# Patient Record
Sex: FEMALE | Race: WHITE | NOT HISPANIC OR LATINO | Employment: OTHER | URBAN - METROPOLITAN AREA
[De-identification: names, ages, dates, MRNs, and addresses within clinical notes are randomized per-mention and may not be internally consistent; named-entity substitution may affect disease eponyms.]

---

## 2017-02-14 ENCOUNTER — APPOINTMENT (OUTPATIENT)
Dept: PHYSICAL THERAPY | Facility: CLINIC | Age: 67
End: 2017-02-14
Payer: MEDICARE

## 2017-02-14 PROCEDURE — 97162 PT EVAL MOD COMPLEX 30 MIN: CPT

## 2017-02-14 PROCEDURE — G8981 BODY POS CURRENT STATUS: HCPCS

## 2017-02-14 PROCEDURE — G8982 BODY POS GOAL STATUS: HCPCS

## 2017-02-16 ENCOUNTER — APPOINTMENT (OUTPATIENT)
Dept: PHYSICAL THERAPY | Facility: CLINIC | Age: 67
End: 2017-02-16
Payer: MEDICARE

## 2017-02-16 PROCEDURE — 97110 THERAPEUTIC EXERCISES: CPT

## 2017-02-21 ENCOUNTER — APPOINTMENT (OUTPATIENT)
Dept: PHYSICAL THERAPY | Facility: CLINIC | Age: 67
End: 2017-02-21
Payer: MEDICARE

## 2017-02-21 PROCEDURE — 97110 THERAPEUTIC EXERCISES: CPT

## 2017-02-23 ENCOUNTER — APPOINTMENT (OUTPATIENT)
Dept: PHYSICAL THERAPY | Facility: CLINIC | Age: 67
End: 2017-02-23
Payer: MEDICARE

## 2017-02-23 PROCEDURE — 97110 THERAPEUTIC EXERCISES: CPT

## 2017-03-02 ENCOUNTER — APPOINTMENT (OUTPATIENT)
Dept: PHYSICAL THERAPY | Facility: CLINIC | Age: 67
End: 2017-03-02
Payer: MEDICARE

## 2017-03-07 ENCOUNTER — APPOINTMENT (OUTPATIENT)
Dept: PHYSICAL THERAPY | Facility: CLINIC | Age: 67
End: 2017-03-07
Payer: MEDICARE

## 2017-03-07 PROCEDURE — 97110 THERAPEUTIC EXERCISES: CPT

## 2017-03-09 ENCOUNTER — APPOINTMENT (OUTPATIENT)
Dept: PHYSICAL THERAPY | Facility: CLINIC | Age: 67
End: 2017-03-09
Payer: MEDICARE

## 2017-03-09 PROCEDURE — 97110 THERAPEUTIC EXERCISES: CPT

## 2017-03-14 ENCOUNTER — APPOINTMENT (OUTPATIENT)
Dept: PHYSICAL THERAPY | Facility: CLINIC | Age: 67
End: 2017-03-14
Payer: MEDICARE

## 2017-03-16 ENCOUNTER — APPOINTMENT (OUTPATIENT)
Dept: PHYSICAL THERAPY | Facility: CLINIC | Age: 67
End: 2017-03-16
Payer: MEDICARE

## 2017-03-16 PROCEDURE — 97110 THERAPEUTIC EXERCISES: CPT

## 2017-03-20 ENCOUNTER — APPOINTMENT (OUTPATIENT)
Dept: PHYSICAL THERAPY | Facility: CLINIC | Age: 67
End: 2017-03-20
Payer: MEDICARE

## 2017-03-20 PROCEDURE — 97110 THERAPEUTIC EXERCISES: CPT

## 2017-03-21 ENCOUNTER — APPOINTMENT (OUTPATIENT)
Dept: PHYSICAL THERAPY | Facility: CLINIC | Age: 67
End: 2017-03-21
Payer: MEDICARE

## 2017-03-23 ENCOUNTER — APPOINTMENT (OUTPATIENT)
Dept: PHYSICAL THERAPY | Facility: CLINIC | Age: 67
End: 2017-03-23
Payer: MEDICARE

## 2018-01-18 NOTE — RESULT NOTES
Verified Results  (1) THIN PREP PAP WITH IMAGING [id number] Comfort Johnson Order Number: [id number]     Test Name Result Flag Reference   LAB AP CASE REPORT (Report)     Gynecologic Cytology Report            Case: [id number]                  Authorizing Provider: FREDI Oliveira    Collected:      08/08/2016 1127        First Screen:     CHICHI Gary Received:      08/11/2016 7273        Specimen:  LIQUID-BASED PAP, SCREENING, Cervix   LAB AP GYN PRIMARY INTERPRETATION      Negative for intraepithelial lesion or malignancy  Electronically signed by CHICHI Gary on 8/16/2016 at 12:52 PM   LAB AP GYN SPECIMEN ADEQUACY      Satisfactory for evaluation  (See note)   LAB AP GYN NOTE      No endocervical cells identified  It is difficult to differentiate between   squamous metaplastic cells and parabasal cells in atrophic specimens due   to numerous causes including menopause, postpartum changes and   progestational agents  LAB AP GYN ADDITIONAL INFORMATION (Report)     [employer] FDA approved ,  and ThinPrep Imaging System are   utilized with strict adherence to the 's instruction manual to   prepare gynecologic and non-gynecologic cytology specimens for the   production of ThinPrep slides as well as for gynecologic ThinPrep imaging  These processes have been validated by our laboratory and/or by the     The Pap test is not a diagnostic procedure and should not be used as the   sole means to detect cervical cancer  It is only a screening procedure to   aid in the detection of cervical cancer and its precursors  Both   false-negative and false-positive results have been experienced  Your   patient's test result should be interpreted in this context together with   the history and clinical findings  LAB AP LMP      Performing Comments: CERVICAL - ENDOCERVICAL  ROUTINE PAP TEST     Performing Comments: CERVICAL - ENDOCERVICAL  ROUTINE PAP TEST

## 2021-07-28 ENCOUNTER — ANESTHESIA EVENT (OUTPATIENT)
Dept: PERIOP | Facility: HOSPITAL | Age: 71
End: 2021-07-28
Payer: MEDICARE

## 2021-07-28 RX ORDER — OMEGA-3S/DHA/EPA/FISH OIL/D3 300MG-1000
400 CAPSULE ORAL DAILY
COMMUNITY

## 2021-07-28 RX ORDER — IRBESARTAN 150 MG/1
TABLET ORAL
COMMUNITY
Start: 2021-07-01

## 2021-07-28 RX ORDER — ATORVASTATIN CALCIUM 10 MG/1
10 TABLET, FILM COATED ORAL
COMMUNITY
Start: 2021-07-12

## 2021-07-28 RX ORDER — BETAMETHASONE DIPROPIONATE 0.5 MG/G
CREAM TOPICAL
COMMUNITY
Start: 2021-06-03

## 2021-07-28 RX ORDER — IBUPROFEN 600 MG/1
TABLET ORAL
COMMUNITY
Start: 2021-04-21

## 2021-07-28 RX ORDER — ESOMEPRAZOLE MAGNESIUM 40 MG/1
40 CAPSULE, DELAYED RELEASE ORAL
COMMUNITY
Start: 2021-05-20

## 2021-07-28 RX ORDER — CELECOXIB 200 MG/1
200 CAPSULE ORAL 2 TIMES DAILY
COMMUNITY
Start: 2021-05-19

## 2021-07-28 RX ORDER — DICYCLOMINE HCL 20 MG
TABLET ORAL
COMMUNITY
Start: 2021-06-23

## 2021-07-28 RX ORDER — AMLODIPINE BESYLATE 5 MG/1
10 TABLET ORAL
COMMUNITY
Start: 2021-06-13

## 2021-07-28 RX ORDER — PROPRANOLOL/HYDROCHLOROTHIAZID 40 MG-25MG
TABLET ORAL
COMMUNITY

## 2021-07-28 RX ORDER — ALPRAZOLAM 0.25 MG/1
TABLET ORAL
COMMUNITY
Start: 2021-07-27

## 2021-07-28 NOTE — PRE-PROCEDURE INSTRUCTIONS
Pre-Surgery Instructions:   Medication Instructions    ALPRAZolam (XANAX) 0 25 mg tablet Patient was instructed by Physician and understands   amLODIPine (NORVASC) 5 mg tablet Instructed patient per Anesthesia Guidelines   atorvastatin (LIPITOR) 10 mg tablet Instructed patient per Anesthesia Guidelines   betamethasone, augmented, (DIPROLENE-AF) 0 05 % cream Instructed patient per Anesthesia Guidelines   celecoxib (CeleBREX) 200 mg capsule Instructed patient per Anesthesia Guidelines   cholecalciferol (VITAMIN D3) 400 units tablet Instructed patient per Anesthesia Guidelines   dicyclomine (BENTYL) 20 mg tablet Instructed patient per Anesthesia Guidelines   esomeprazole (NexIUM) 40 MG capsule Instructed patient per Anesthesia Guidelines   ibuprofen (MOTRIN) 600 mg tablet Instructed patient per Anesthesia Guidelines   irbesartan (AVAPRO) 150 mg tablet Instructed patient per Anesthesia Guidelines   Peppermint Oil (IBGARD PO) Instructed patient per Anesthesia Guidelines   Turmeric 500 MG CAPS Instructed patient per Anesthesia Guidelines  All pre-op instructions reviewed as per [facility] My [facility] Experience w/pt verb understanding  Inst NPO post MN night before sx except sips water w/ xanax & nexium & bentyl on am of sx Inst to continue avapro through day before sx, but to HOLD on morning of sx  Inst to take lipitor & amlodipine evening before sx as she typically does  Instructed to avoid all ASA and OTC Vit/Supp 1 week prior to surgery and to avoid NSAIDs 3 days prior to surgery  Tylenol ok to take prn  Received pre-op showering instructions from surgeon office, has CHG, reviewed process at time of call  Current hospital visitor & masking policy reviewed  Pt verbalized an understanding of all instructions reviewed and offers no concerns at this time

## 2021-08-10 NOTE — ANESTHESIA PREPROCEDURE EVALUATION
Procedure:  REPAIR MOHS OF NOSE (N/A Nose)    Relevant Problems   CARDIO   (+) HTN (hypertension)        Physical Exam    Airway    Mallampati score: II  TM Distance: >3 FB  Neck ROM: full     Dental   No notable dental hx     Cardiovascular  Cardiovascular exam normal    Pulmonary  Pulmonary exam normal     Other Findings        Anesthesia Plan  ASA Score- 2     Anesthesia Type- IV sedation with anesthesia with ASA Monitors  Additional Monitors:   Airway Plan:           Plan Factors-Exercise tolerance (METS): >4 METS  Chart reviewed  Patient is not a current smoker  Patient not instructed to abstain from smoking on day of procedure  Patient did not smoke on day of surgery  Induction- intravenous  Postoperative Plan-     Informed Consent- Anesthetic plan and risks discussed with patient  I personally reviewed this patient with the CRNA  Discussed and agreed on the Anesthesia Plan with the CRNA  Escobar Diaz

## 2021-08-11 ENCOUNTER — ANESTHESIA (OUTPATIENT)
Dept: PERIOP | Facility: HOSPITAL | Age: 71
End: 2021-08-11
Payer: MEDICARE

## 2021-08-11 ENCOUNTER — HOSPITAL ENCOUNTER (OUTPATIENT)
Facility: HOSPITAL | Age: 71
Setting detail: OUTPATIENT SURGERY
Discharge: HOME | End: 2021-08-11
Attending: PLASTIC SURGERY | Admitting: PLASTIC SURGERY
Payer: MEDICARE

## 2021-08-11 VITALS
WEIGHT: 216 LBS | RESPIRATION RATE: 18 BRPM | HEIGHT: 63 IN | TEMPERATURE: 97.8 F | SYSTOLIC BLOOD PRESSURE: 130 MMHG | OXYGEN SATURATION: 97 % | BODY MASS INDEX: 38.27 KG/M2 | HEART RATE: 81 BPM | DIASTOLIC BLOOD PRESSURE: 64 MMHG

## 2021-08-11 PROBLEM — I10 HTN (HYPERTENSION): Status: ACTIVE | Noted: 2021-08-11

## 2021-08-11 RX ORDER — HYDROCODONE BITARTRATE AND ACETAMINOPHEN 5; 325 MG/1; MG/1
1 TABLET ORAL EVERY 6 HOURS PRN
Status: DISCONTINUED | OUTPATIENT
Start: 2021-08-11 | End: 2021-08-11 | Stop reason: HOSPADM

## 2021-08-11 RX ORDER — PROPOFOL 10 MG/ML
INJECTION, EMULSION INTRAVENOUS AS NEEDED
Status: DISCONTINUED | OUTPATIENT
Start: 2021-08-11 | End: 2021-08-11

## 2021-08-11 RX ORDER — ONDANSETRON 4 MG/1
4 TABLET, ORALLY DISINTEGRATING ORAL EVERY 6 HOURS PRN
Status: DISCONTINUED | OUTPATIENT
Start: 2021-08-11 | End: 2021-08-11 | Stop reason: HOSPADM

## 2021-08-11 RX ORDER — METOCLOPRAMIDE HYDROCHLORIDE 5 MG/ML
10 INJECTION INTRAMUSCULAR; INTRAVENOUS ONCE AS NEEDED
Status: DISCONTINUED | OUTPATIENT
Start: 2021-08-11 | End: 2021-08-11 | Stop reason: HOSPADM

## 2021-08-11 RX ORDER — FENTANYL CITRATE 50 UG/ML
INJECTION, SOLUTION INTRAMUSCULAR; INTRAVENOUS AS NEEDED
Status: DISCONTINUED | OUTPATIENT
Start: 2021-08-11 | End: 2021-08-11

## 2021-08-11 RX ORDER — BALANCED SALT SOLUTION 6.4; .75; .48; .3; 3.9; 1.7 MG/ML; MG/ML; MG/ML; MG/ML; MG/ML; MG/ML
SOLUTION OPHTHALMIC AS NEEDED
Status: DISCONTINUED | OUTPATIENT
Start: 2021-08-11 | End: 2021-08-11 | Stop reason: HOSPADM

## 2021-08-11 RX ORDER — SODIUM CHLORIDE, SODIUM LACTATE, POTASSIUM CHLORIDE, CALCIUM CHLORIDE 600; 310; 30; 20 MG/100ML; MG/100ML; MG/100ML; MG/100ML
125 INJECTION, SOLUTION INTRAVENOUS CONTINUOUS
Status: DISCONTINUED | OUTPATIENT
Start: 2021-08-11 | End: 2021-08-11 | Stop reason: HOSPADM

## 2021-08-11 RX ORDER — FENTANYL CITRATE/PF 50 MCG/ML
25 SYRINGE (ML) INJECTION
Status: DISCONTINUED | OUTPATIENT
Start: 2021-08-11 | End: 2021-08-11 | Stop reason: HOSPADM

## 2021-08-11 RX ORDER — PROPOFOL 10 MG/ML
INJECTION, EMULSION INTRAVENOUS CONTINUOUS PRN
Status: DISCONTINUED | OUTPATIENT
Start: 2021-08-11 | End: 2021-08-11

## 2021-08-11 RX ORDER — MIDAZOLAM HYDROCHLORIDE 2 MG/2ML
INJECTION, SOLUTION INTRAMUSCULAR; INTRAVENOUS AS NEEDED
Status: DISCONTINUED | OUTPATIENT
Start: 2021-08-11 | End: 2021-08-11

## 2021-08-11 RX ORDER — LIDOCAINE HYDROCHLORIDE AND EPINEPHRINE 5; 5 MG/ML; UG/ML
INJECTION, SOLUTION INFILTRATION; PERINEURAL AS NEEDED
Status: DISCONTINUED | OUTPATIENT
Start: 2021-08-11 | End: 2021-08-11 | Stop reason: HOSPADM

## 2021-08-11 RX ORDER — CEFAZOLIN SODIUM 2 G/50ML
SOLUTION INTRAVENOUS AS NEEDED
Status: DISCONTINUED | OUTPATIENT
Start: 2021-08-11 | End: 2021-08-11

## 2021-08-11 RX ORDER — ONDANSETRON 2 MG/ML
4 INJECTION INTRAMUSCULAR; INTRAVENOUS ONCE AS NEEDED
Status: DISCONTINUED | OUTPATIENT
Start: 2021-08-11 | End: 2021-08-11 | Stop reason: HOSPADM

## 2021-08-11 RX ORDER — LIDOCAINE HYDROCHLORIDE 10 MG/ML
INJECTION, SOLUTION EPIDURAL; INFILTRATION; INTRACAUDAL; PERINEURAL AS NEEDED
Status: DISCONTINUED | OUTPATIENT
Start: 2021-08-11 | End: 2021-08-11

## 2021-08-11 RX ADMIN — FENTANYL CITRATE 50 MCG: 50 INJECTION, SOLUTION INTRAMUSCULAR; INTRAVENOUS at 15:23

## 2021-08-11 RX ADMIN — PROPOFOL 30 MG: 10 INJECTION, EMULSION INTRAVENOUS at 15:36

## 2021-08-11 RX ADMIN — PROPOFOL 80 MG: 10 INJECTION, EMULSION INTRAVENOUS at 15:27

## 2021-08-11 RX ADMIN — LIDOCAINE HYDROCHLORIDE 50 MG: 10 INJECTION, SOLUTION EPIDURAL; INFILTRATION; INTRACAUDAL; PERINEURAL at 15:27

## 2021-08-11 RX ADMIN — PROPOFOL 30 MG: 10 INJECTION, EMULSION INTRAVENOUS at 15:40

## 2021-08-11 RX ADMIN — MIDAZOLAM 2 MG: 1 INJECTION INTRAMUSCULAR; INTRAVENOUS at 15:23

## 2021-08-11 RX ADMIN — FENTANYL CITRATE 50 MCG: 50 INJECTION, SOLUTION INTRAMUSCULAR; INTRAVENOUS at 15:51

## 2021-08-11 RX ADMIN — CEFAZOLIN SODIUM 2000 MG: 2 SOLUTION INTRAVENOUS at 15:16

## 2021-08-11 RX ADMIN — SODIUM CHLORIDE, SODIUM LACTATE, POTASSIUM CHLORIDE, AND CALCIUM CHLORIDE: .6; .31; .03; .02 INJECTION, SOLUTION INTRAVENOUS at 14:49

## 2021-08-11 RX ADMIN — PROPOFOL 75 MCG/KG/MIN: 10 INJECTION, EMULSION INTRAVENOUS at 15:27

## 2021-08-11 NOTE — OP NOTE
OPERATIVE REPORT  PATIENT NAME: Fawad Vaca    :  1950  MRN: [id number]  Pt Location:  OR ROOM 08    SURGERY DATE: 2021    Surgeon(s) and Role:     * Nicanor Llamas MD - Primary    Preop and postoperative Diagnosis:  Mohs defect left mid nose    Operative Procedure(s) (LRB):  REPAIR MOHS OF NOSE FLAP RECONSTRUCTION (N/A) Nasalis keystone advancement muscular cutaneous flap    Operative history:  The patient had Mohs surgery today to remove a basal cell carcinoma from her left mid nose  This left an oval defect that was 9 mm in vertical height by about 15 mm in width  A keystone flap was designed over then Ace Allis muscle superior to this of size identical   This was then advanced to close the defect with minimal tension  Operative procedure: The patient was taken to the operating placed supine the operating table  She was prepped and draped in usual fashion  Anesthesia was general supplemented xylocaine 1% with epinephrine  Hemostasis in the Mohs defect was achieved with the bipolar  A keystone flap was then designed superior to the defect  The sides were cut and spread to allow of the flexibility of the flap  The flap was then advanced the lesser curvature sewn into the distal aspect of the Mohs defect with 4-O interrupted simple sutures the sides and greater curvature similarly sewed it to the surrounding nasal skin with 4-0 nylon interrupted simple sutures  Light dressings were applied  The patient tolerated procedure well 10 taken to recovery in good condition      SIGNATURE: Nicanor Llamas MD  DATE: 2021  TIME: 4:14 PM

## 2021-08-11 NOTE — ANESTHESIA POSTPROCEDURE EVALUATION
Post-Op Assessment Note    CV Status:  Stable  Pain Score: 0    Pain management: adequate     Mental Status:  Alert and awake   Hydration Status:  Euvolemic   PONV Controlled:  Controlled   Airway Patency:  Patent      Post Op Vitals Reviewed: Yes      Staff: CRNA         No complications documented      /69 (08/11/21 1614)    Temp 97 6 °F (36 4 °C) (08/11/21 1614)    Pulse 85 (08/11/21 1614)   Resp 16 (08/11/21 1614)    SpO2 95 % (08/11/21 1614)

## 2021-08-11 NOTE — INTERVAL H&P NOTE
H&P reviewed  After examining the patient I find no changes in the patients condition since the H&P had been written      Vitals:    08/11/21 1150   BP: 125/63   Pulse: 97   Resp: 18   Temp: 98 6 °F (37 °C)   SpO2: 95%

## 2021-08-11 NOTE — DISCHARGE INSTRUCTIONS
Care For Your Stitches   WHAT YOU NEED TO KNOW:   Stitches, or sutures, are used to close cuts and wounds on the skin  Stitches need to be removed after your wound has healed  DISCHARGE INSTRUCTIONS:   Seek care immediately if:   · Your stitches come apart  · Blood soaks through your bandages  · You suddenly cannot move your injured joint  · You have sudden numbness around your wound  · You see red streaks coming from your wound  Contact your healthcare provider if:   · You have a fever and chills  · Your wound is red, warm, swollen, or leaking pus  · There is a bad smell coming from your wound  · You have increased pain in the wound area  · You have questions or concerns about your condition or care  Care for your stitches:   · Protect the stitches  You may need to cover your stitches with a bandage for 24 to 48 hours, or as directed  Do not bump or hit the suture area  This could open the wound  Do not trim or shorten the ends of your stitches  If they rub on your clothing, put a gauze bandage between the stitches and your clothes  · Clean the area as directed  Carefully wash your wound with soap and water  For mouth and lip wounds, rinse your mouth after meals and at bedtime  Ask your healthcare provider what to use to rinse your mouth  If you have a scalp wound, you may gently wash your hair every 2 days with mild shampoo  Do not use hair products, such as hair spray  Check your wound for signs of infection when you clean it  Signs include redness, swelling, and pus  · Keep the area dry as directed  Wait 12 to 24 hours after you receive your stitches before you take a shower  Take showers instead of baths  Do not take a bath or swim  Your healthcare provider will give you instructions for bathing with your stitches  Help your wound heal:   · Elevate your wound  Keep your wound above the level of your heart as often as you can   This will help decrease swelling and pain  Prop the area on pillows or blankets, if possible, to keep it elevated comfortably  · Limit activity  Do not stretch the skin around your wound  This will help prevent bleeding and swelling  Follow up with your healthcare provider as directed: You may need to return to have your stitches removed  Write down your questions so you remember to ask them during your visits  © Copyright [employer] 2021 Information is for End User's use only and may not be sold, redistributed or otherwise used for commercial purposes  All illustrations and images included in CareNotes® are the copyrighted property of [employer]  or [address] [place], [place] [address]patrice Cano   The above information is an  only  It is not intended as medical advice for individual conditions or treatments  Talk to your doctor, nurse or pharmacist before following any medical regimen to see if it is safe and effective for you

## 2022-09-15 ENCOUNTER — ESTABLISHED COMPREHENSIVE EXAM (OUTPATIENT)
Dept: URBAN - METROPOLITAN AREA CLINIC 6 | Facility: CLINIC | Age: 72
End: 2022-09-15

## 2022-09-15 DIAGNOSIS — H43.813: ICD-10-CM

## 2022-09-15 DIAGNOSIS — H02.834: ICD-10-CM

## 2022-09-15 DIAGNOSIS — H02.831: ICD-10-CM

## 2022-09-15 DIAGNOSIS — H25.813: ICD-10-CM

## 2022-09-15 PROCEDURE — 92015 DETERMINE REFRACTIVE STATE: CPT

## 2022-09-15 PROCEDURE — 92014 COMPRE OPH EXAM EST PT 1/>: CPT

## 2022-09-15 ASSESSMENT — VISUAL ACUITY
OS_GLARE: 20/80
OS_CC: 20/40
OU_CC: J1+
OD_GLARE: 20/80
OD_CC: 20/40-2
OS_PH: 20/40

## 2022-09-15 ASSESSMENT — TONOMETRY
OS_IOP_MMHG: 16
OD_IOP_MMHG: 16

## 2024-01-24 ENCOUNTER — ESTABLISHED COMPREHENSIVE EXAM (OUTPATIENT)
Dept: URBAN - METROPOLITAN AREA CLINIC 6 | Facility: CLINIC | Age: 74
End: 2024-01-24

## 2024-01-24 DIAGNOSIS — H02.834: ICD-10-CM

## 2024-01-24 DIAGNOSIS — H43.813: ICD-10-CM

## 2024-01-24 DIAGNOSIS — H02.831: ICD-10-CM

## 2024-01-24 DIAGNOSIS — H25.813: ICD-10-CM

## 2024-01-24 PROCEDURE — 92014 COMPRE OPH EXAM EST PT 1/>: CPT

## 2024-01-24 ASSESSMENT — VISUAL ACUITY
OS_CC: J2
OD_CC: J2
OD_CC: 20/30
OS_CC: 20/25

## 2024-01-24 ASSESSMENT — TONOMETRY
OS_IOP_MMHG: 14
OD_IOP_MMHG: 13

## 2024-10-02 ENCOUNTER — CATARACT PRE-OP EXAM (OUTPATIENT)
Dept: URBAN - METROPOLITAN AREA CLINIC 6 | Facility: CLINIC | Age: 74
End: 2024-10-02

## 2024-10-02 DIAGNOSIS — H02.834: ICD-10-CM

## 2024-10-02 DIAGNOSIS — H02.831: ICD-10-CM

## 2024-10-02 DIAGNOSIS — H43.813: ICD-10-CM

## 2024-10-02 DIAGNOSIS — H25.813: ICD-10-CM

## 2024-10-02 PROCEDURE — 92014 COMPRE OPH EXAM EST PT 1/>: CPT

## 2024-10-02 PROCEDURE — 92136 OPHTHALMIC BIOMETRY: CPT

## 2024-10-02 ASSESSMENT — VISUAL ACUITY
OD_CC: 20/30-2
OS_GLARE: 20/40-2
OD_GLARE: 20/40
OS_CC: 20/30-2

## 2024-10-02 ASSESSMENT — TONOMETRY
OD_IOP_MMHG: 13
OS_IOP_MMHG: 13

## 2024-10-02 ASSESSMENT — KERATOMETRY
OD_AXISANGLE_DEGREES: 81
OS_AXISANGLE2_DEGREES: 93
OS_K1POWER_DIOPTERS: 42.75
OD_AXISANGLE2_DEGREES: 171
OD_K1POWER_DIOPTERS: 42.50
OD_K2POWER_DIOPTERS: 43.00
OS_K2POWER_DIOPTERS: 43.00
OS_AXISANGLE_DEGREES: 3

## 2025-01-08 ENCOUNTER — 1 DAY POST-OP (OUTPATIENT)
Dept: URBAN - METROPOLITAN AREA CLINIC 6 | Facility: CLINIC | Age: 75
End: 2025-01-08

## 2025-01-08 DIAGNOSIS — Z96.1: ICD-10-CM

## 2025-01-08 DIAGNOSIS — H25.811: ICD-10-CM

## 2025-01-08 PROCEDURE — 92136 OPHTHALMIC BIOMETRY: CPT | Mod: 26,RT

## 2025-01-08 PROCEDURE — 99024 POSTOP FOLLOW-UP VISIT: CPT

## 2025-01-08 ASSESSMENT — TONOMETRY
OS_IOP_MMHG: 19
OD_IOP_MMHG: 13

## 2025-01-08 ASSESSMENT — VISUAL ACUITY
OS_SC: 20/50
OS_PH: 20/40-2
OD_CC: 20/30-2

## 2025-01-08 ASSESSMENT — KERATOMETRY
OD_K2POWER_DIOPTERS: 43.00
OD_AXISANGLE_DEGREES: 81
OD_AXISANGLE2_DEGREES: 171
OD_K1POWER_DIOPTERS: 42.50
OS_K2POWER_DIOPTERS: 43.00
OS_AXISANGLE_DEGREES: 3
OS_AXISANGLE2_DEGREES: 93
OS_K1POWER_DIOPTERS: 42.75

## 2025-01-15 ENCOUNTER — 1 WEEK POST-OP (OUTPATIENT)
Dept: URBAN - METROPOLITAN AREA CLINIC 6 | Facility: CLINIC | Age: 75
End: 2025-01-15

## 2025-01-15 DIAGNOSIS — Z96.1: ICD-10-CM

## 2025-01-15 DIAGNOSIS — H25.811: ICD-10-CM

## 2025-01-15 PROCEDURE — 99024 POSTOP FOLLOW-UP VISIT: CPT

## 2025-01-15 ASSESSMENT — KERATOMETRY
OS_K2POWER_DIOPTERS: 43.00
OD_K2POWER_DIOPTERS: 43.00
OD_AXISANGLE_DEGREES: 81
OS_K1POWER_DIOPTERS: 42.75
OS_AXISANGLE2_DEGREES: 93
OD_K1POWER_DIOPTERS: 42.50
OD_AXISANGLE2_DEGREES: 171
OS_AXISANGLE_DEGREES: 3

## 2025-01-15 ASSESSMENT — VISUAL ACUITY
OS_SC: 20/100
OS_PH: 20/40-1
OU_SC: J3

## 2025-01-15 ASSESSMENT — TONOMETRY
OD_IOP_MMHG: 12
OS_IOP_MMHG: 10

## 2025-01-22 ENCOUNTER — 1 DAY POST-OP (OUTPATIENT)
Dept: URBAN - METROPOLITAN AREA CLINIC 6 | Facility: CLINIC | Age: 75
End: 2025-01-22

## 2025-01-22 DIAGNOSIS — Z96.1: ICD-10-CM

## 2025-01-22 PROCEDURE — 99024 POSTOP FOLLOW-UP VISIT: CPT

## 2025-01-22 ASSESSMENT — KERATOMETRY
OS_K1POWER_DIOPTERS: 42.75
OD_AXISANGLE2_DEGREES: 171
OD_K2POWER_DIOPTERS: 43.00
OD_K1POWER_DIOPTERS: 42.50
OS_K2POWER_DIOPTERS: 43.00
OS_AXISANGLE2_DEGREES: 93
OS_AXISANGLE_DEGREES: 3
OD_AXISANGLE_DEGREES: 81

## 2025-01-22 ASSESSMENT — VISUAL ACUITY
OD_PH: 20/30-1
OS_SC: 20/40-2
OS_PH: 20/30

## 2025-01-22 ASSESSMENT — TONOMETRY
OS_IOP_MMHG: 11
OD_IOP_MMHG: 19

## 2025-01-29 ENCOUNTER — 1 WEEK POST-OP (OUTPATIENT)
Dept: URBAN - METROPOLITAN AREA CLINIC 6 | Facility: CLINIC | Age: 75
End: 2025-01-29

## 2025-01-29 DIAGNOSIS — Z96.1: ICD-10-CM

## 2025-01-29 PROCEDURE — 99024 POSTOP FOLLOW-UP VISIT: CPT

## 2025-01-29 ASSESSMENT — TONOMETRY
OD_IOP_MMHG: 11
OS_IOP_MMHG: 8

## 2025-01-29 ASSESSMENT — KERATOMETRY
OD_AXISANGLE2_DEGREES: 171
OD_K2POWER_DIOPTERS: 43.00
OS_K1POWER_DIOPTERS: 42.75
OS_K2POWER_DIOPTERS: 43.00
OD_AXISANGLE_DEGREES: 81
OS_AXISANGLE2_DEGREES: 93
OS_AXISANGLE_DEGREES: 3
OD_K1POWER_DIOPTERS: 42.50

## 2025-01-29 ASSESSMENT — VISUAL ACUITY
OU_SC: J2
OD_PH: 20/25
OS_SC: 20/30+2
OD_SC: 20/60-1

## 2025-03-07 ENCOUNTER — POST-OP CHECK (OUTPATIENT)
Dept: URBAN - METROPOLITAN AREA CLINIC 6 | Facility: CLINIC | Age: 75
End: 2025-03-07

## 2025-03-07 DIAGNOSIS — Z96.1: ICD-10-CM

## 2025-03-07 PROCEDURE — 99024 POSTOP FOLLOW-UP VISIT: CPT

## 2025-03-07 PROCEDURE — 92134 CPTRZ OPH DX IMG PST SGM RTA: CPT | Mod: NC

## 2025-03-07 ASSESSMENT — KERATOMETRY
OS_AXISANGLE_DEGREES: 3
OD_AXISANGLE_DEGREES: 81
OD_K1POWER_DIOPTERS: 42.50
OD_AXISANGLE2_DEGREES: 171
OD_K2POWER_DIOPTERS: 43.00
OS_K2POWER_DIOPTERS: 43.00
OS_AXISANGLE2_DEGREES: 93
OS_K1POWER_DIOPTERS: 42.75

## 2025-03-07 ASSESSMENT — TONOMETRY
OS_IOP_MMHG: 8
OD_IOP_MMHG: 8

## 2025-03-07 ASSESSMENT — VISUAL ACUITY
OU_SC: J2
OU_SC: 20/40
OD_SC: 20/70
OD_PH: 20/30
OS_SC: 20/30

## 2025-06-11 ENCOUNTER — ESTABLISHED COMPREHENSIVE EXAM (OUTPATIENT)
Dept: URBAN - METROPOLITAN AREA CLINIC 6 | Facility: CLINIC | Age: 75
End: 2025-06-11

## 2025-06-11 DIAGNOSIS — H02.834: ICD-10-CM

## 2025-06-11 DIAGNOSIS — H43.813: ICD-10-CM

## 2025-06-11 DIAGNOSIS — H02.831: ICD-10-CM

## 2025-06-11 DIAGNOSIS — H26.493: ICD-10-CM

## 2025-06-11 PROCEDURE — 92014 COMPRE OPH EXAM EST PT 1/>: CPT

## 2025-06-11 ASSESSMENT — VISUAL ACUITY
OU_CC: J1
OD_CC: 20/30
OS_CC: 20/30
OD_GLARE: 20/50
OS_GLARE: 20/40

## 2025-06-11 ASSESSMENT — KERATOMETRY
OD_AXISANGLE_DEGREES: 81
OS_K1POWER_DIOPTERS: 42.75
OD_K2POWER_DIOPTERS: 43.00
OS_AXISANGLE2_DEGREES: 93
OD_AXISANGLE2_DEGREES: 171
OD_K1POWER_DIOPTERS: 42.50
OS_AXISANGLE_DEGREES: 3
OS_K2POWER_DIOPTERS: 43.00

## 2025-06-11 ASSESSMENT — TONOMETRY
OS_IOP_MMHG: 11
OD_IOP_MMHG: 11

## 2025-09-04 ENCOUNTER — CLINIC PROCEDURE ONLY (OUTPATIENT)
Dept: URBAN - METROPOLITAN AREA CLINIC 6 | Facility: CLINIC | Age: 75
End: 2025-09-04

## 2025-09-04 DIAGNOSIS — H26.493: ICD-10-CM

## 2025-09-04 PROCEDURE — 66821 AFTER CATARACT LASER SURGERY: CPT

## 2025-09-04 ASSESSMENT — KERATOMETRY
OS_K1POWER_DIOPTERS: 42.75
OD_K2POWER_DIOPTERS: 43.00
OD_K1POWER_DIOPTERS: 42.50
OS_K2POWER_DIOPTERS: 43.00
OD_AXISANGLE2_DEGREES: 171
OS_AXISANGLE_DEGREES: 3
OS_AXISANGLE2_DEGREES: 93
OD_AXISANGLE_DEGREES: 81

## 2025-09-04 ASSESSMENT — VISUAL ACUITY
OS_SC: 20/60
OD_SC: 20/50

## 2025-09-04 ASSESSMENT — TONOMETRY
OS_IOP_MMHG: 14
OD_IOP_MMHG: 14

## (undated) DEVICE — CABLE BIPOLAR DISP MEGADYNE

## (undated) DEVICE — SKIN MARKER DUAL TIP WITH RULER CAP, FLEXIBLE RULER AND LABELS: Brand: DEVON

## (undated) DEVICE — 1820 FOAM BLOCK NEEDLE COUNTER: Brand: DEVON

## (undated) DEVICE — TUBING SUCTION 5MM X 12 FT

## (undated) DEVICE — SYRINGE 30ML LL

## (undated) DEVICE — STERILE POLYISOPRENE POWDER-FREE SURGICAL GLOVES: Brand: PROTEXIS

## (undated) DEVICE — BASIC PACK: Brand: CONVERTORS

## (undated) DEVICE — LIGHT GLOVE GREEN

## (undated) DEVICE — NEEDLE 25G X 1 1/2

## (undated) DEVICE — SUT ETHILON 4-0 P-S 18 IN 699H

## (undated) DEVICE — SYRINGE 10ML LL CONTROL TOP

## (undated) DEVICE — INTENDED FOR TISSUE SEPARATION, AND OTHER PROCEDURES THAT REQUIRE A SHARP SURGICAL BLADE TO PUNCTURE OR CUT.: Brand: BARD-PARKER SAFETY BLADES SIZE 15, STERILE

## (undated) DEVICE — SINGLE PORT MANIFOLD: Brand: NEPTUNE 2

## (undated) DEVICE — ASTOUND STANDARD SURGICAL GOWN, XL: Brand: CONVERTORS

## (undated) DEVICE — SCD SEQUENTIAL COMPRESSION COMFORT SLEEVE MEDIUM KNEE LENGTH: Brand: KENDALL SCD

## (undated) DEVICE — NEEDLE BLUNT 18 G X 1 1/2IN

## (undated) DEVICE — GLOVE INDICATOR PI UNDERGLOVE SZ 8 BLUE

## (undated) DEVICE — CRADLE EXTREMITY UNIVERSAL CONTOURED

## (undated) DEVICE — ELECTRODE NEEDLE MOD E-Z CLEAN 2.75IN 7CM -0013M

## (undated) DEVICE — TIBURON SPLIT SHEET: Brand: CONVERTORS

## (undated) DEVICE — SPONGE 4 X 4 XRAY 16 PLY STRL LF RFD